# Patient Record
Sex: MALE | Race: WHITE | Employment: OTHER
[De-identification: names, ages, dates, MRNs, and addresses within clinical notes are randomized per-mention and may not be internally consistent; named-entity substitution may affect disease eponyms.]

---

## 2017-01-09 ENCOUNTER — OFFICE VISIT (OUTPATIENT)
Dept: FAMILY MEDICINE CLINIC | Facility: CLINIC | Age: 52
End: 2017-01-09

## 2017-01-09 VITALS
SYSTOLIC BLOOD PRESSURE: 128 MMHG | OXYGEN SATURATION: 98 % | HEART RATE: 72 BPM | HEIGHT: 68 IN | DIASTOLIC BLOOD PRESSURE: 86 MMHG | WEIGHT: 203 LBS | TEMPERATURE: 97.8 F | BODY MASS INDEX: 30.77 KG/M2

## 2017-01-09 DIAGNOSIS — Z13.220 SCREENING, LIPID: ICD-10-CM

## 2017-01-09 DIAGNOSIS — M75.21 BICIPITAL TENDONITIS OF RIGHT SHOULDER: Primary | ICD-10-CM

## 2017-01-09 PROCEDURE — 99203 OFFICE O/P NEW LOW 30 MIN: CPT | Performed by: PHYSICIAN ASSISTANT

## 2017-01-09 RX ORDER — MELOXICAM 7.5 MG/1
7.5 TABLET ORAL 2 TIMES DAILY PRN
Qty: 30 TABLET | Refills: 0 | Status: SHIPPED | OUTPATIENT
Start: 2017-01-09 | End: 2018-05-18 | Stop reason: ALTCHOICE

## 2017-01-09 ASSESSMENT — ENCOUNTER SYMPTOMS
COUGH: 0
DIARRHEA: 0
VOMITING: 0
WHEEZING: 0
COLOR CHANGE: 0
NAUSEA: 0
SHORTNESS OF BREATH: 0
BACK PAIN: 0
CONSTIPATION: 0
ABDOMINAL PAIN: 0

## 2018-05-18 ENCOUNTER — OFFICE VISIT (OUTPATIENT)
Dept: FAMILY MEDICINE CLINIC | Age: 53
End: 2018-05-18
Payer: COMMERCIAL

## 2018-05-18 VITALS
BODY MASS INDEX: 31.49 KG/M2 | HEIGHT: 68 IN | DIASTOLIC BLOOD PRESSURE: 70 MMHG | SYSTOLIC BLOOD PRESSURE: 122 MMHG | TEMPERATURE: 97.8 F | HEART RATE: 56 BPM | WEIGHT: 207.8 LBS | OXYGEN SATURATION: 99 %

## 2018-05-18 DIAGNOSIS — Z12.5 PROSTATE CANCER SCREENING: ICD-10-CM

## 2018-05-18 DIAGNOSIS — Z13.220 SCREENING, LIPID: ICD-10-CM

## 2018-05-18 DIAGNOSIS — R10.9 FLANK PAIN: ICD-10-CM

## 2018-05-18 DIAGNOSIS — R30.0 DYSURIA: ICD-10-CM

## 2018-05-18 DIAGNOSIS — Z12.11 COLON CANCER SCREENING: ICD-10-CM

## 2018-05-18 DIAGNOSIS — R31.29 MICROSCOPIC HEMATURIA: Primary | ICD-10-CM

## 2018-05-18 LAB
BILIRUBIN, POC: ABNORMAL
BLOOD URINE, POC: ABNORMAL
CLARITY, POC: ABNORMAL
COLOR, POC: YELLOW
GLUCOSE URINE, POC: ABNORMAL
KETONES, POC: ABNORMAL
LEUKOCYTE EST, POC: ABNORMAL
NITRITE, POC: ABNORMAL
PH, POC: 5.5
PROTEIN, POC: 100
SPECIFIC GRAVITY, POC: 1.02
UROBILINOGEN, POC: 0.2

## 2018-05-18 PROCEDURE — 99213 OFFICE O/P EST LOW 20 MIN: CPT | Performed by: PHYSICIAN ASSISTANT

## 2018-05-18 PROCEDURE — 81003 URINALYSIS AUTO W/O SCOPE: CPT | Performed by: PHYSICIAN ASSISTANT

## 2018-05-18 RX ORDER — OMEPRAZOLE 20 MG/1
20 CAPSULE, DELAYED RELEASE ORAL DAILY
COMMUNITY
End: 2019-10-30

## 2018-05-18 ASSESSMENT — PATIENT HEALTH QUESTIONNAIRE - PHQ9
2. FEELING DOWN, DEPRESSED OR HOPELESS: 0
SUM OF ALL RESPONSES TO PHQ9 QUESTIONS 1 & 2: 0
1. LITTLE INTEREST OR PLEASURE IN DOING THINGS: 0
SUM OF ALL RESPONSES TO PHQ QUESTIONS 1-9: 0

## 2018-05-18 ASSESSMENT — ENCOUNTER SYMPTOMS
NAUSEA: 0
VOMITING: 0

## 2018-12-26 ENCOUNTER — TELEPHONE (OUTPATIENT)
Dept: GASTROENTEROLOGY | Age: 53
End: 2018-12-26

## 2019-10-30 ENCOUNTER — OFFICE VISIT (OUTPATIENT)
Dept: FAMILY MEDICINE CLINIC | Age: 54
End: 2019-10-30
Payer: COMMERCIAL

## 2019-10-30 VITALS
HEART RATE: 55 BPM | HEIGHT: 68 IN | SYSTOLIC BLOOD PRESSURE: 128 MMHG | DIASTOLIC BLOOD PRESSURE: 75 MMHG | OXYGEN SATURATION: 96 % | WEIGHT: 197 LBS | BODY MASS INDEX: 29.86 KG/M2 | TEMPERATURE: 97.6 F

## 2019-10-30 DIAGNOSIS — Z12.5 SCREENING FOR MALIGNANT NEOPLASM OF PROSTATE: ICD-10-CM

## 2019-10-30 DIAGNOSIS — R09.82 PND (POST-NASAL DRIP): Primary | ICD-10-CM

## 2019-10-30 DIAGNOSIS — Z12.11 SCREEN FOR COLON CANCER: ICD-10-CM

## 2019-10-30 DIAGNOSIS — Z23 FLU VACCINE NEED: ICD-10-CM

## 2019-10-30 DIAGNOSIS — Z13.220 SCREENING FOR LIPID DISORDERS: ICD-10-CM

## 2019-10-30 DIAGNOSIS — Z00.00 ROUTINE GENERAL MEDICAL EXAMINATION AT A HEALTH CARE FACILITY: ICD-10-CM

## 2019-10-30 DIAGNOSIS — R05.9 COUGH: ICD-10-CM

## 2019-10-30 PROCEDURE — 99213 OFFICE O/P EST LOW 20 MIN: CPT | Performed by: NURSE PRACTITIONER

## 2019-10-30 RX ORDER — BENZONATATE 200 MG/1
200 CAPSULE ORAL 3 TIMES DAILY PRN
Qty: 30 CAPSULE | Refills: 0 | Status: SHIPPED | OUTPATIENT
Start: 2019-10-30 | End: 2019-11-06

## 2019-10-30 ASSESSMENT — ENCOUNTER SYMPTOMS
EYE DISCHARGE: 0
SINUS PAIN: 0
VOMITING: 0
SORE THROAT: 0
NAUSEA: 0
CHEST TIGHTNESS: 0
SINUS PRESSURE: 0
COUGH: 1
WHEEZING: 0
VOICE CHANGE: 1
DIARRHEA: 0
SHORTNESS OF BREATH: 0
RHINORRHEA: 0

## 2019-10-30 ASSESSMENT — PATIENT HEALTH QUESTIONNAIRE - PHQ9
SUM OF ALL RESPONSES TO PHQ QUESTIONS 1-9: 0
SUM OF ALL RESPONSES TO PHQ9 QUESTIONS 1 & 2: 0
2. FEELING DOWN, DEPRESSED OR HOPELESS: 0
1. LITTLE INTEREST OR PLEASURE IN DOING THINGS: 0
SUM OF ALL RESPONSES TO PHQ QUESTIONS 1-9: 0

## 2021-02-10 ENCOUNTER — TELEPHONE (OUTPATIENT)
Dept: FAMILY MEDICINE CLINIC | Age: 56
End: 2021-02-10

## 2023-01-16 ENCOUNTER — OFFICE VISIT (OUTPATIENT)
Dept: FAMILY MEDICINE CLINIC | Age: 58
End: 2023-01-16
Payer: COMMERCIAL

## 2023-01-16 VITALS
TEMPERATURE: 97.9 F | BODY MASS INDEX: 31.22 KG/M2 | HEIGHT: 68 IN | SYSTOLIC BLOOD PRESSURE: 130 MMHG | HEART RATE: 80 BPM | DIASTOLIC BLOOD PRESSURE: 84 MMHG | WEIGHT: 206 LBS | OXYGEN SATURATION: 98 %

## 2023-01-16 DIAGNOSIS — Z12.5 SCREENING FOR PROSTATE CANCER: ICD-10-CM

## 2023-01-16 DIAGNOSIS — K21.9 GASTROESOPHAGEAL REFLUX DISEASE, UNSPECIFIED WHETHER ESOPHAGITIS PRESENT: Primary | ICD-10-CM

## 2023-01-16 DIAGNOSIS — L72.9 CYST OF SKIN: ICD-10-CM

## 2023-01-16 PROCEDURE — 99213 OFFICE O/P EST LOW 20 MIN: CPT | Performed by: PHYSICIAN ASSISTANT

## 2023-01-16 RX ORDER — OMEPRAZOLE 20 MG/1
20 CAPSULE, DELAYED RELEASE ORAL DAILY
Qty: 30 CAPSULE | Refills: 5 | Status: SHIPPED | OUTPATIENT
Start: 2023-01-16

## 2023-01-16 RX ORDER — OMEPRAZOLE 20 MG/1
20 CAPSULE, DELAYED RELEASE ORAL DAILY
COMMUNITY
End: 2023-01-16 | Stop reason: SDUPTHER

## 2023-01-16 RX ORDER — FAMOTIDINE 20 MG/1
20 TABLET, FILM COATED ORAL 2 TIMES DAILY
Qty: 60 TABLET | Refills: 3 | Status: SHIPPED | OUTPATIENT
Start: 2023-01-16

## 2023-01-16 ASSESSMENT — ENCOUNTER SYMPTOMS
COUGH: 0
DIARRHEA: 0
SHORTNESS OF BREATH: 0
NAUSEA: 0
CONSTIPATION: 0
ABDOMINAL PAIN: 0
WHEEZING: 0
VOMITING: 0
ROS SKIN COMMENTS: CYST
COLOR CHANGE: 0

## 2023-01-16 ASSESSMENT — PATIENT HEALTH QUESTIONNAIRE - PHQ9
SUM OF ALL RESPONSES TO PHQ QUESTIONS 1-9: 0
SUM OF ALL RESPONSES TO PHQ9 QUESTIONS 1 & 2: 0
1. LITTLE INTEREST OR PLEASURE IN DOING THINGS: 0
SUM OF ALL RESPONSES TO PHQ QUESTIONS 1-9: 0
2. FEELING DOWN, DEPRESSED OR HOPELESS: 0

## 2023-01-16 NOTE — PROGRESS NOTES
[facility]  [address] [address]  Dept: [phone]  Dept Fax: [phone]    Sumeet Thompson is a 56 y.o. male who presents today for his medical conditions/complaintsas noted below. Sumeet Thompson is c/o of   Chief Complaint   Patient presents with    Cyst     ON BACK X 1 WEEK    Heartburn     He has been using omeprazole daily, OTC         HPI:     HPI    Patient states he has dealt with gastritis and GERD for many years. He had a EGD around 5 years ago. Has used OTC PPI off and on since. Requesting this to be sent through insurance. He has recently been using it daily but would like to back down on frequency of use. He is wondering if there is anything else he can use for more fast acting for as needed basis. Patient states he had a cyst on his back recently. It started after he had been riding his bike and exercising more actively. States he was sweating more at the time. It did drain after initially appearing and has been feeling much better since and also smaller. No results found for: LABA1C          ( goal A1Cis < 7)   No results found for: LABMICR  No results found for: LDLCHOLESTEROL, LDLCALC    (goal LDL is <100)   No results found for: AST, ALT, BUN, CR  BP Readings from Last 3 Encounters:   01/16/23 130/84   10/30/19 128/75   05/18/18 122/70          (goal 120/80)    Past Medical History:   Diagnosis Date    GERD (gastroesophageal reflux disease)     Nephrolithiasis       History reviewed. No pertinent surgical history.     Family History   Problem Relation Age of Onset    Cancer Mother         breast    No Known Problems Father     No Known Problems Sister     Other Brother         back surgery    No Known Problems Maternal Grandmother     No Known Problems Maternal Grandfather     No Known Problems Paternal Grandmother     No Known Problems Paternal Grandfather        Social History     Tobacco Use Smoking status: Former     Packs/day: 0.25     Years: 2.00     Pack years: 0.50     Types: Cigarettes     Start date: 2006     Quit date: 2008     Years since quitting: 15.0    Smokeless tobacco: Never   Substance Use Topics    Alcohol use: Yes     Comment: occasional      Current Outpatient Medications   Medication Sig Dispense Refill    famotidine (PEPCID) 20 MG tablet Take 1 tablet by mouth 2 times daily 60 tablet 3    omeprazole (PRILOSEC) 20 MG delayed release capsule Take 1 capsule by mouth daily 30 capsule 5     No current facility-administered medications for this visit. No Known Allergies    Health Maintenance   Topic Date Due    COVID-19 Vaccine (1) 1967    Depression Screen  Never done    HIV screen  Never done    Hepatitis C screen  Never done    DTaP/Tdap/Td vaccine (1 - Tdap) Never done    Diabetes screen  Never done    Lipids  Never done    Shingles vaccine (1 of 2) Never done    Colorectal Cancer Screen  02/26/2023    Flu vaccine  Completed    Hepatitis A vaccine  Aged Out    Hib vaccine  Aged Out    Meningococcal (ACWY) vaccine  Aged Out    Pneumococcal 0-64 years Vaccine  Aged Out       Subjective:     Review of Systems   Constitutional:  Negative for activity change, appetite change, fatigue, fever and unexpected weight change. /84 (Site: Left Upper Arm, Position: Sitting, Cuff Size: Large Adult)   Pulse 80   Temp 97.9 °F (36.6 °C) (Tympanic)   Ht 5' 8\" (1.727 m)   Wt 206 lb (93.4 kg)   SpO2 98%   BMI 31.32 kg/m²    Respiratory:  Negative for cough, shortness of breath and wheezing. Cardiovascular:  Negative for chest pain and palpitations. Gastrointestinal:  Negative for abdominal pain, constipation, diarrhea, nausea and vomiting. Skin:  Positive for wound. Negative for color change, pallor and rash. Cyst   Neurological:  Negative for weakness. Psychiatric/Behavioral:  The patient is not nervous/anxious.       Objective:     Physical Exam  Vitals and nursing note reviewed. Constitutional:       General: He is not in acute distress. Appearance: Normal appearance. He is well-developed. He is not ill-appearing. HENT:      Head: Normocephalic and atraumatic. Cardiovascular:      Rate and Rhythm: Normal rate and regular rhythm. Heart sounds: No murmur heard. Pulmonary:      Effort: Pulmonary effort is normal. No respiratory distress. Breath sounds: Normal breath sounds. No wheezing, rhonchi or rales. Skin:     General: Skin is warm and dry. Coloration: Skin is not pale. Findings: No erythema or rash. Comments: Very small pink, healing sebaceous cyst mid back. No induration, no evidence of infection. Appears to be healing very well   Neurological:      Mental Status: He is alert and oriented to person, place, and time. Psychiatric:         Mood and Affect: Mood normal.         Behavior: Behavior normal.         Thought Content: Thought content normal.         Judgment: Judgment normal.     /84 (Site: Left Upper Arm, Position: Sitting, Cuff Size: Large Adult)   Pulse 80   Temp 97.9 °F (36.6 °C) (Tympanic)   Ht 5' 8\" (1.727 m)   Wt 206 lb (93.4 kg)   SpO2 98%   BMI 31.32 kg/m²     Assessment:       Diagnosis Orders   1. Gastroesophageal reflux disease, unspecified whether esophagitis present  famotidine (PEPCID) 20 MG tablet    omeprazole (PRILOSEC) 20 MG delayed release capsule    Comprehensive Metabolic Panel    Lipid Panel    CBC with Auto Differential      2. Screening for prostate cancer  PSA Screening      3. Cyst of skin                  Plan:      Return if symptoms worsen or fail to improve. Cyst is healing well since it drained on its own. No evidence of infection. I did encourage him to monitor the area and if it does refill or recur we may need to I&D it. Patient agreed to monitor  Update annual labs, order given. Await results  Refill on omeprazole.   Use and side effect reviewed along with long-term effect on kidney function and bone health. Patient would like to try Pepcid for as needed use and try to get off the PPI. I did refill for both. I recommended he continue to stay in contact with his gastroenterologist for 1 repeat EGD as needed  Patient agreed with treatment plan    Orders Placed This Encounter   Procedures    Comprehensive Metabolic Panel     Standing Status:   Future     Standing Expiration Date:   1/16/2024    Lipid Panel     Standing Status:   Future     Standing Expiration Date:   1/16/2024     Order Specific Question:   Is Patient Fasting?/# of Hours     Answer:   yes    CBC with Auto Differential     Standing Status:   Future     Standing Expiration Date:   1/16/2024    PSA Screening     Standing Status:   Future     Standing Expiration Date:   1/16/2024       Orders Placed This Encounter   Medications    famotidine (PEPCID) 20 MG tablet     Sig: Take 1 tablet by mouth 2 times daily     Dispense:  60 tablet     Refill:  3    omeprazole (PRILOSEC) 20 MG delayed release capsule     Sig: Take 1 capsule by mouth daily     Dispense:  30 capsule     Refill:  5         Patient given educational materials - see patient instructions. Discussed use, benefit, and side effects of prescribed medications. All patientquestions answered. Pt voiced understanding. Reviewed health maintenance. Instructedto continue current medications, diet and exercise. Patient agreed with treatmentplan. Follow up as directed. Please note that this chart was generated using voice recognition Dragon dictation software. Although every effort was made to ensure the accuracy of this automated transcription, some errors in transcription may have occurred.      Electronically signed by Conchita Livingston PA-C on 1/16/2023 at 1:08 PM

## 2023-01-16 NOTE — PROGRESS NOTES
Visit Information    Have you changed or started any medications since your last visit including any over-the-counter medicines, vitamins, or herbal medicines? no   Are you having any side effects from any of your medications? -  no  Have you stopped taking any of your medications? Is so, why? -  no    Have you seen any other physician or provider since your last visit? No  Have you had any other diagnostic tests since your last visit? No  Have you been seen in the emergency room and/or had an admission to a hospital since we last saw you? No  Have you had your routine dental cleaning in the past 6 months? no    Have you activated your [employer] account? If not, what are your barriers?  No:      Patient Care Team:  Rubi Walton PA-C as PCP - General (Family Medicine)  Rubi Walton PA-C as PCP - [facility] Provider    Medical History Review  Past Medical, Family, and Social History reviewed and  contribute to the patient presenting condition    Health Maintenance   Topic Date Due    COVID-19 Vaccine (1) Never done    Depression Screen  Never done    HIV screen  Never done    Hepatitis C screen  Never done    DTaP/Tdap/Td vaccine (1 - Tdap) Never done    Lipids  Never done    Shingles vaccine (1 of 2) Never done    Flu vaccine (1) Never done    Colorectal Cancer Screen  02/26/2023    Hepatitis A vaccine  Aged Out    Hib vaccine  Aged Out    Meningococcal (ACWY) vaccine  Aged Out    Pneumococcal 0-64 years Vaccine  Aged Out

## 2023-03-08 ENCOUNTER — OFFICE VISIT (OUTPATIENT)
Dept: FAMILY MEDICINE CLINIC | Age: 58
End: 2023-03-08
Payer: COMMERCIAL

## 2023-03-08 ENCOUNTER — TELEPHONE (OUTPATIENT)
Dept: FAMILY MEDICINE CLINIC | Age: 58
End: 2023-03-08

## 2023-03-08 VITALS
HEIGHT: 68 IN | TEMPERATURE: 98.1 F | DIASTOLIC BLOOD PRESSURE: 82 MMHG | BODY MASS INDEX: 30.92 KG/M2 | OXYGEN SATURATION: 98 % | HEART RATE: 81 BPM | SYSTOLIC BLOOD PRESSURE: 124 MMHG | WEIGHT: 204 LBS

## 2023-03-08 DIAGNOSIS — R51.9 NEW ONSET HEADACHE: Primary | ICD-10-CM

## 2023-03-08 DIAGNOSIS — R51.9 NOCTURNAL HEADACHES: Primary | ICD-10-CM

## 2023-03-08 DIAGNOSIS — R51.9 NOCTURNAL HEADACHES: ICD-10-CM

## 2023-03-08 PROCEDURE — 99213 OFFICE O/P EST LOW 20 MIN: CPT | Performed by: PHYSICIAN ASSISTANT

## 2023-03-08 SDOH — ECONOMIC STABILITY: FOOD INSECURITY: WITHIN THE PAST 12 MONTHS, YOU WORRIED THAT YOUR FOOD WOULD RUN OUT BEFORE YOU GOT MONEY TO BUY MORE.: NEVER TRUE

## 2023-03-08 SDOH — ECONOMIC STABILITY: INCOME INSECURITY: HOW HARD IS IT FOR YOU TO PAY FOR THE VERY BASICS LIKE FOOD, HOUSING, MEDICAL CARE, AND HEATING?: NOT HARD AT ALL

## 2023-03-08 SDOH — ECONOMIC STABILITY: FOOD INSECURITY: WITHIN THE PAST 12 MONTHS, THE FOOD YOU BOUGHT JUST DIDN'T LAST AND YOU DIDN'T HAVE MONEY TO GET MORE.: NEVER TRUE

## 2023-03-08 SDOH — ECONOMIC STABILITY: HOUSING INSECURITY
IN THE LAST 12 MONTHS, WAS THERE A TIME WHEN YOU DID NOT HAVE A STEADY PLACE TO SLEEP OR SLEPT IN A SHELTER (INCLUDING NOW)?: NO

## 2023-03-08 ASSESSMENT — ENCOUNTER SYMPTOMS
SHORTNESS OF BREATH: 0
COUGH: 0
WHEEZING: 0
COLOR CHANGE: 0

## 2023-03-08 NOTE — PROGRESS NOTES
Visit Information    Have you changed or started any medications since your last visit including any over-the-counter medicines, vitamins, or herbal medicines? no   Are you having any side effects from any of your medications? -  no  Have you stopped taking any of your medications? Is so, why? -  no    Have you seen any other physician or provider since your last visit? No  Have you had any other diagnostic tests since your last visit? No  Have you been seen in the emergency room and/or had an admission to a hospital since we last saw you? No  Have you had your routine dental cleaning in the past 6 months? no    Have you activated your [employer] account? If not, what are your barriers?  No:      Patient Care Team:  Zully Bermudez PA-C as PCP - General (Family Medicine)  Zully Bermudez PA-C as PCP - Empaneled Provider    Medical History Review  Past Medical, Family, and Social History reviewed and  contribute to the patient presenting condition    Health Maintenance   Topic Date Due    COVID-19 Vaccine (1) 1967    HIV screen  Never done    Hepatitis C screen  Never done    DTaP/Tdap/Td vaccine (1 - Tdap) Never done    Diabetes screen  Never done    Lipids  Never done    Shingles vaccine (1 of 2) Never done    Colorectal Cancer Screen  02/26/2023    Depression Screen  01/16/2024    Flu vaccine  Completed    Hepatitis A vaccine  Aged Out    Hib vaccine  Aged Out    Meningococcal (ACWY) vaccine  Aged Out    Pneumococcal 0-64 years Vaccine  Aged Out

## 2023-03-08 NOTE — TELEPHONE ENCOUNTER
Spoke with [employer]. Order is AKA CT head in our epic version. Please fax to [employer] advising of this.

## 2023-03-08 NOTE — TELEPHONE ENCOUNTER
Pt scheduled at [facility] for CT on 3/21/23. Pt unable to go to [id number] [facility] due to insurance. [facility] scheduling states they need order to say CT BRAIN WO.      Please advise

## 2023-03-08 NOTE — PROGRESS NOTES
[facility]  [address]  [facility] [facility] [address]  Dept: [phone]  Dept Fax: [phone]    Donavan Loja is a 56 y.o. male who presents today for his medical conditions/complaintsas noted below. Donavan Loja is c/o of   Chief Complaint   Patient presents with    Headache     Happening in the middle of the night, left side of his head- he has never had head like this in the past         HPI:     HPI    Patient states he has been having headaches x 3 weeks that wake him up at night around 1am. He states during the day he feels well. Never been bothered by headaches in the past. He has had a lot of stress with his fathers illness recently. Initially thought stress was a trigger. He normally sleeps well, normally uninterupted  Pain presents with left temple pain that radiates to the left eye. Taking an aleve does help. He started taking an aleve prior to bed and has slept better. No vision changes. Pain feels like a deep, pressure feeling. If he pushes on the side of the head it helps. No noted nausea or other symptoms related. Patient denies any neck or back pain. No noted numbness/tingling. No results found for: LABA1C          ( goal A1Cis < 7)   No results found for: LABMICR  No results found for: LDLCHOLESTEROL, LDLCALC    (goal LDL is <100)   No results found for: AST, ALT, BUN, CR  BP Readings from Last 3 Encounters:   03/08/23 124/82   01/16/23 130/84   10/30/19 128/75          (goal 120/80)    Past Medical History:   Diagnosis Date    GERD (gastroesophageal reflux disease)     Nephrolithiasis       History reviewed. No pertinent surgical history.     Family History   Problem Relation Age of Onset    Cancer Mother         breast    No Known Problems Father     No Known Problems Sister     Other Brother         back surgery    No Known Problems Maternal Grandmother     No Known Problems Maternal Grandfather     No Known Problems Paternal Grandmother     No Known Problems Paternal Grandfather        Social History     Tobacco Use    Smoking status: Former     Packs/day: 0.25     Years: 2.00     Pack years: 0.50     Types: Cigarettes     Start date: 2006     Quit date: 2008     Years since quitting: 15.1    Smokeless tobacco: Never   Substance Use Topics    Alcohol use: Yes     Comment: occasional      Current Outpatient Medications   Medication Sig Dispense Refill    omeprazole (PRILOSEC) 20 MG delayed release capsule Take 1 capsule by mouth daily 30 capsule 5    famotidine (PEPCID) 20 MG tablet Take 1 tablet by mouth 2 times daily (Patient not taking: Reported on 3/8/2023) 60 tablet 3     No current facility-administered medications for this visit. No Known Allergies    Health Maintenance   Topic Date Due    COVID-19 Vaccine (1) 1965    HIV screen  Never done    Hepatitis C screen  Never done    DTaP/Tdap/Td vaccine (1 - Tdap) Never done    Diabetes screen  Never done    Lipids  Never done    Shingles vaccine (1 of 2) Never done    Colorectal Cancer Screen  02/26/2023    Depression Screen  01/16/2024    Flu vaccine  Completed    Hepatitis A vaccine  Aged Out    Hib vaccine  Aged Out    Meningococcal (ACWY) vaccine  Aged Out    Pneumococcal 0-64 years Vaccine  Aged Out       Subjective:     Review of Systems   Constitutional:  Negative for activity change, appetite change, fatigue, fever and unexpected weight change. /82 (Site: Left Upper Arm, Position: Sitting, Cuff Size: Large Adult)   Pulse 81   Temp 98.1 °F (36.7 °C) (Tympanic)   Ht 5' 8\" (1.727 m)   Wt 204 lb (92.5 kg)   SpO2 98%   BMI 31.02 kg/m²    HENT:  Negative for congestion. Eyes:  Negative for visual disturbance. Respiratory:  Negative for cough, shortness of breath and wheezing. Cardiovascular:  Negative for chest pain, palpitations and leg swelling. Skin:  Negative for color change and pallor.    Neurological:  Positive for headaches. Negative for dizziness, facial asymmetry, weakness, light-headedness and numbness. Hematological:  Negative for adenopathy. Psychiatric/Behavioral:  Positive for sleep disturbance. Negative for agitation, behavioral problems, confusion and decreased concentration. The patient is not nervous/anxious. Objective:     Physical Exam  Vitals and nursing note reviewed. Constitutional:       General: He is not in acute distress. Appearance: Normal appearance. He is well-developed. He is not ill-appearing. HENT:      Head: Normocephalic and atraumatic. Eyes:      General:         Right eye: No discharge. Left eye: No discharge. Extraocular Movements: Extraocular movements intact. Pupils: Pupils are equal, round, and reactive to light. Neck:      Vascular: No carotid bruit. Cardiovascular:      Rate and Rhythm: Normal rate and regular rhythm. Heart sounds: No murmur heard. Pulmonary:      Effort: Pulmonary effort is normal. No respiratory distress. Breath sounds: Normal breath sounds. No wheezing, rhonchi or rales. Musculoskeletal:      Cervical back: Neck supple. Lymphadenopathy:      Cervical: No cervical adenopathy. Skin:     General: Skin is warm and dry. Coloration: Skin is not pale. Findings: No erythema or rash. Neurological:      General: No focal deficit present. Mental Status: He is alert and oriented to person, place, and time. Mental status is at baseline. Cranial Nerves: No cranial nerve deficit. Sensory: No sensory deficit. Motor: No weakness. Gait: Gait normal.   Psychiatric:         Mood and Affect: Mood normal.         Behavior: Behavior normal.         Thought Content:  Thought content normal.         Judgment: Judgment normal.     /82 (Site: Left Upper Arm, Position: Sitting, Cuff Size: Large Adult)   Pulse 81   Temp 98.1 °F (36.7 °C) (Tympanic)   Ht 5' 8\" (1.727 m)   Wt 204 lb (92.5 kg) SpO2 98%   BMI 31.02 kg/m²     Assessment:       Diagnosis Orders   1. New onset headache  CT HEAD WO CONTRAST      2. Nocturnal headaches  CT HEAD WO CONTRAST                Plan:      Return if symptoms worsen or fail to improve, for await results.    Will check CT at this point as these headaches are new. Recommended he also have his eyes checked. Patient agreed to call to schedule  Ok to use Aleve prn, encouraged to dose with food.   Use ER if any acute severe or worsening symptoms  Patient agreed with treatment plan  Await results    Orders Placed This Encounter   Procedures    CT HEAD WO CONTRAST     Standing Status:   Future     Standing Expiration Date:   3/8/2024     Order Specific Question:   Reason for exam:     Answer:   nocturnal headache       Patient given educational materials - see patient instructions.Discussed use, benefit, and side effects of prescribed medications.  All patientquestions answered. Pt voiced understanding. Reviewed health maintenance.  Instructedto continue current medications, diet and exercise.  Patient agreed with treatmentplan. Follow up as directed.       Please note that this chart was generated using voice recognition Dragon dictation software.  Although every effort was made to ensure the accuracy of this automated transcription, some errors in transcription may have occurred.     Electronically signed by JARROD POLANCO PA-C on 3/8/2023 at 1:25 PM

## 2023-06-08 ENCOUNTER — TELEPHONE (OUTPATIENT)
Dept: FAMILY MEDICINE CLINIC | Age: 58
End: 2023-06-08

## 2023-06-08 NOTE — TELEPHONE ENCOUNTER
Triage call from [facility] ([facility]) to office     Brief description of triage: right shoulder pain with numbness    Onset? 1 year ago    Location? Right shoulder    Pattern? Comes and goes    Severity on a scale of 1-10? N/A    Radiation? N/A    Pregnancy? N/A    Outcome? Patient was sent in for nurse triage due to right shoulder pain and numbness. Patient states this has been an ongoing issue for 1 year but that it is getting worse. Due to length of time, no chest pain or radiation to chest , or shortness of breath patient made appt for 6/15.

## 2023-06-20 LAB
ALBUMIN SERPL-MCNC: 4.3 G/DL
ALP BLD-CCNC: 78 U/L
ALT SERPL-CCNC: 18 U/L
ANION GAP SERPL CALCULATED.3IONS-SCNC: 11 MMOL/L
AST SERPL-CCNC: 17 U/L
AVERAGE GLUCOSE: 114
BASOPHILS ABSOLUTE: 0 /ΜL
BASOPHILS RELATIVE PERCENT: 0.7 %
BILIRUB SERPL-MCNC: 0.7 MG/DL (ref 0.1–1.4)
BUN BLDV-MCNC: 14 MG/DL
CALCIUM SERPL-MCNC: 9.3 MG/DL
CHLORIDE BLD-SCNC: 110 MMOL/L
CHOLESTEROL, TOTAL: 222 MG/DL
CHOLESTEROL/HDL RATIO: 5.2
CO2: 23 MMOL/L
CREAT SERPL-MCNC: 0.91 MG/DL
EGFR: 90
EOSINOPHILS ABSOLUTE: 0.1 /ΜL
EOSINOPHILS RELATIVE PERCENT: 2.1 %
GLUCOSE BLD-MCNC: 102 MG/DL
HBA1C MFR BLD: 5.6 %
HCT VFR BLD CALC: 44.1 % (ref 41–53)
HDLC SERPL-MCNC: 43 MG/DL (ref 35–70)
HEMOGLOBIN: 15.1 G/DL (ref 13.5–17.5)
LDL CHOLESTEROL CALCULATED: 153 MG/DL (ref 0–160)
LYMPHOCYTES ABSOLUTE: 1.7 /ΜL
LYMPHOCYTES RELATIVE PERCENT: 31.4 %
MCH RBC QN AUTO: 31.9 PG
MCHC RBC AUTO-ENTMCNC: 34.2 G/DL
MCV RBC AUTO: 93 FL
MONOCYTES ABSOLUTE: 0.5 /ΜL
MONOCYTES RELATIVE PERCENT: 9 %
NEUTROPHILS ABSOLUTE: 3 /ΜL
NEUTROPHILS RELATIVE PERCENT: 56.8 %
NONHDLC SERPL-MCNC: NORMAL MG/DL
PDW BLD-RTO: 13.5 %
PLATELET # BLD: 222 K/ΜL
PMV BLD AUTO: 8.8 FL
POTASSIUM SERPL-SCNC: 3.9 MMOL/L
PROSTATE SPECIFIC ANTIGEN: 1.26 NG/ML
RBC # BLD: 4.73 10^6/ΜL
SODIUM BLD-SCNC: 144 MMOL/L
TOTAL PROTEIN: 7
TRIGL SERPL-MCNC: 130 MG/DL
TSH SERPL DL<=0.05 MIU/L-ACNC: 2.4 UIU/ML
VLDLC SERPL CALC-MCNC: 26 MG/DL
WBC # BLD: 5.3 10^3/ML

## 2023-06-21 DIAGNOSIS — K21.9 GASTROESOPHAGEAL REFLUX DISEASE, UNSPECIFIED WHETHER ESOPHAGITIS PRESENT: ICD-10-CM

## 2023-06-21 DIAGNOSIS — Z13.220 SCREENING, LIPID: ICD-10-CM

## 2023-06-21 DIAGNOSIS — Z12.5 SCREENING FOR PROSTATE CANCER: ICD-10-CM

## 2023-06-21 DIAGNOSIS — Z13.1 SCREENING FOR DIABETES MELLITUS: ICD-10-CM

## 2024-03-19 ENCOUNTER — OFFICE VISIT (OUTPATIENT)
Dept: FAMILY MEDICINE CLINIC | Age: 59
End: 2024-03-19
Payer: COMMERCIAL

## 2024-03-19 VITALS
OXYGEN SATURATION: 98 % | HEIGHT: 68 IN | DIASTOLIC BLOOD PRESSURE: 84 MMHG | BODY MASS INDEX: 32.28 KG/M2 | HEART RATE: 74 BPM | SYSTOLIC BLOOD PRESSURE: 128 MMHG | WEIGHT: 213 LBS

## 2024-03-19 DIAGNOSIS — Z12.5 SCREENING FOR PROSTATE CANCER: ICD-10-CM

## 2024-03-19 DIAGNOSIS — M25.511 CHRONIC RIGHT SHOULDER PAIN: Primary | ICD-10-CM

## 2024-03-19 DIAGNOSIS — G89.29 CHRONIC RIGHT SHOULDER PAIN: Primary | ICD-10-CM

## 2024-03-19 DIAGNOSIS — Z13.220 SCREENING, LIPID: ICD-10-CM

## 2024-03-19 DIAGNOSIS — Z13.1 SCREENING FOR DIABETES MELLITUS: ICD-10-CM

## 2024-03-19 DIAGNOSIS — Z11.59 NEED FOR HEPATITIS C SCREENING TEST: ICD-10-CM

## 2024-03-19 DIAGNOSIS — R39.15 URINARY URGENCY: ICD-10-CM

## 2024-03-19 DIAGNOSIS — K21.9 GASTROESOPHAGEAL REFLUX DISEASE, UNSPECIFIED WHETHER ESOPHAGITIS PRESENT: ICD-10-CM

## 2024-03-19 PROCEDURE — 99213 OFFICE O/P EST LOW 20 MIN: CPT | Performed by: PHYSICIAN ASSISTANT

## 2024-03-19 RX ORDER — TOLTERODINE 4 MG/1
4 CAPSULE, EXTENDED RELEASE ORAL DAILY
Qty: 30 CAPSULE | Refills: 0 | Status: SHIPPED | OUTPATIENT
Start: 2024-03-19

## 2024-03-19 RX ORDER — OMEPRAZOLE 20 MG/1
20 CAPSULE, DELAYED RELEASE ORAL DAILY
Qty: 30 CAPSULE | Refills: 5 | Status: SHIPPED | OUTPATIENT
Start: 2024-03-19

## 2024-03-19 SDOH — ECONOMIC STABILITY: FOOD INSECURITY: WITHIN THE PAST 12 MONTHS, THE FOOD YOU BOUGHT JUST DIDN'T LAST AND YOU DIDN'T HAVE MONEY TO GET MORE.: NEVER TRUE

## 2024-03-19 SDOH — ECONOMIC STABILITY: FOOD INSECURITY: WITHIN THE PAST 12 MONTHS, YOU WORRIED THAT YOUR FOOD WOULD RUN OUT BEFORE YOU GOT MONEY TO BUY MORE.: NEVER TRUE

## 2024-03-19 SDOH — ECONOMIC STABILITY: INCOME INSECURITY: HOW HARD IS IT FOR YOU TO PAY FOR THE VERY BASICS LIKE FOOD, HOUSING, MEDICAL CARE, AND HEATING?: NOT HARD AT ALL

## 2024-03-19 ASSESSMENT — PATIENT HEALTH QUESTIONNAIRE - PHQ9
SUM OF ALL RESPONSES TO PHQ QUESTIONS 1-9: 0
1. LITTLE INTEREST OR PLEASURE IN DOING THINGS: NOT AT ALL
2. FEELING DOWN, DEPRESSED OR HOPELESS: NOT AT ALL
SUM OF ALL RESPONSES TO PHQ9 QUESTIONS 1 & 2: 0
SUM OF ALL RESPONSES TO PHQ QUESTIONS 1-9: 0

## 2024-03-19 ASSESSMENT — ENCOUNTER SYMPTOMS
ABDOMINAL PAIN: 0
COLOR CHANGE: 0
CONSTIPATION: 0
NAUSEA: 0
COUGH: 0
DIARRHEA: 0
VOMITING: 0
SHORTNESS OF BREATH: 0

## 2024-03-19 NOTE — PROGRESS NOTES
Visit Information    Have you changed or started any medications since your last visit including any over-the-counter medicines, vitamins, or herbal medicines? no   Are you having any side effects from any of your medications? -  no  Have you stopped taking any of your medications? Is so, why? -  no    Have you seen any other physician or provider since your last visit? No  Have you had any other diagnostic tests since your last visit? No  Have you been seen in the emergency room and/or had an admission to a hospital since we last saw you? No  Have you had your routine dental cleaning in the past 6 months? no    Have you activated your [employer] account? If not, what are your barriers? Yes     Patient Care Team:  Silva White PA-C as PCP - General (Family Medicine)  Silva White PA-C as PCP - Empaneled Provider    Medical History Review  Past Medical, Family, and Social History reviewed and  contribute to the patient presenting condition    Health Maintenance   Topic Date Due    Hepatitis B vaccine (1 of 3 - 3-dose series) Never done    HIV screen  Never done    Hepatitis C screen  Never done    DTaP/Tdap/Td vaccine (1 - Tdap) Never done    Shingles vaccine (1 of 2) Never done    Colorectal Cancer Screen  02/26/2023    Flu vaccine (1) 08/01/2023    COVID-19 Vaccine (2 - 2023-24 season) 09/01/2023    Depression Screen  01/16/2024    Diabetes screen  06/20/2026    Lipids  06/20/2028    Hepatitis A vaccine  Aged Out    Hib vaccine  Aged Out    Polio vaccine  Aged Out    Meningococcal (ACWY) vaccine  Aged Out    Pneumococcal 0-64 years Vaccine  Aged Out    Prostate Specific Antigen (PSA) Screening or Monitoring  Discontinued       
disease)     Nephrolithiasis       History reviewed. No pertinent surgical history.    Family History   Problem Relation Age of Onset    Cancer Mother         breast    No Known Problems Father     No Known Problems Sister     Other Brother         back surgery    No Known Problems Maternal Grandmother     No Known Problems Maternal Grandfather     No Known Problems Paternal Grandmother     No Known Problems Paternal Grandfather        Social History     Tobacco Use    Smoking status: Former     Current packs/day: 0.00     Average packs/day: 0.3 packs/day for 2.0 years (0.5 ttl pk-yrs)     Types: Cigarettes     Start date:      Quit date:      Years since quittin.2    Smokeless tobacco: Never   Substance Use Topics    Alcohol use: Yes     Comment: occasional      Current Outpatient Medications   Medication Sig Dispense Refill    omeprazole (PRILOSEC) 20 MG delayed release capsule Take 1 capsule by mouth daily 30 capsule 5    tolterodine (DETROL LA) 4 MG extended release capsule Take 1 capsule by mouth daily 30 capsule 0     No current facility-administered medications for this visit.     No Known Allergies    Health Maintenance   Topic Date Due    Hepatitis B vaccine (1 of 3 - 3-dose series) Never done    HIV screen  Never done    Hepatitis C screen  Never done    DTaP/Tdap/Td vaccine (1 - Tdap) Never done    Shingles vaccine (1 of 2) Never done    Colorectal Cancer Screen  2023    Flu vaccine (1) 2023    COVID-19 Vaccine (2 - - season) 2023    Depression Screen  2025    Diabetes screen  2026    Lipids  2028    Hepatitis A vaccine  Aged Out    Hib vaccine  Aged Out    Polio vaccine  Aged Out    Meningococcal (ACWY) vaccine  Aged Out    Pneumococcal 0-64 years Vaccine  Aged Out    Prostate Specific Antigen (PSA) Screening or Monitoring  Discontinued       Subjective:     Review of Systems   Constitutional:  Negative for activity change, appetite change, fatigue

## 2024-04-11 LAB
BILIRUBIN, URINE: NORMAL
BLOOD, URINE: NORMAL
CLARITY: NORMAL
COLOR: NORMAL
GLUCOSE URINE: NORMAL
KETONES, URINE: NORMAL
LEUKOCYTE ESTERASE, URINE: NORMAL
NITRITE, URINE: NORMAL
PH UA: NORMAL
PROTEIN UA: NORMAL
SPECIFIC GRAVITY, URINE: NORMAL
UROBILINOGEN, URINE: NORMAL

## 2024-04-12 LAB
ALBUMIN SERPL-MCNC: 4.4 G/DL
ALP BLD-CCNC: 91 U/L
ALT SERPL-CCNC: 18 U/L
ANION GAP SERPL CALCULATED.3IONS-SCNC: 8 MMOL/L
ANTIBODY: NEGATIVE
AST SERPL-CCNC: 16 U/L
BASOPHILS ABSOLUTE: 0 /ΜL
BASOPHILS RELATIVE PERCENT: 0.6 %
BILIRUB SERPL-MCNC: 0.9 MG/DL (ref 0.1–1.4)
BUN BLDV-MCNC: 17 MG/DL
CALCIUM SERPL-MCNC: 9 MG/DL
CHLORIDE BLD-SCNC: 106 MMOL/L
CHOLESTEROL, TOTAL: 243 MG/DL
CHOLESTEROL/HDL RATIO: 4.9
CO2: 24 MMOL/L
CREAT SERPL-MCNC: 0.9 MG/DL
EGFR: 90
EOSINOPHILS ABSOLUTE: 0.1 /ΜL
EOSINOPHILS RELATIVE PERCENT: 1 %
ESTIMATED AVERAGE GLUCOSE: 111
GLUCOSE BLD-MCNC: 102 MG/DL
HBA1C MFR BLD: 5.5 %
HCT VFR BLD CALC: 43.8 % (ref 41–53)
HDLC SERPL-MCNC: 50 MG/DL (ref 35–70)
HEMOGLOBIN: 14.8 G/DL (ref 13.5–17.5)
LDL CHOLESTEROL CALCULATED: 169 MG/DL (ref 0–160)
LYMPHOCYTES ABSOLUTE: 1.8 /ΜL
LYMPHOCYTES RELATIVE PERCENT: 28.8 %
MCH RBC QN AUTO: 31.5 PG
MCHC RBC AUTO-ENTMCNC: 33.8 G/DL
MCV RBC AUTO: 93 FL
MONOCYTES ABSOLUTE: 0.6 /ΜL
MONOCYTES RELATIVE PERCENT: 9.1 %
NEUTROPHILS ABSOLUTE: 3.7 /ΜL
NEUTROPHILS RELATIVE PERCENT: 60.5 %
NONHDLC SERPL-MCNC: ABNORMAL MG/DL
PDW BLD-RTO: 13.9 %
PLATELET # BLD: 230 K/ΜL
PMV BLD AUTO: 8.1 FL
POTASSIUM SERPL-SCNC: 4 MMOL/L
RBC # BLD: 4.71 10^6/ΜL
SODIUM BLD-SCNC: 138 MMOL/L
TOTAL PROTEIN: 7.1
TRIGL SERPL-MCNC: 119 MG/DL
VLDLC SERPL CALC-MCNC: 24 MG/DL
WBC # BLD: 6.2 10^3/ML

## 2024-04-15 DIAGNOSIS — R39.15 URINARY URGENCY: ICD-10-CM

## 2024-04-15 DIAGNOSIS — Z13.220 SCREENING, LIPID: ICD-10-CM

## 2024-04-15 DIAGNOSIS — Z11.59 NEED FOR HEPATITIS C SCREENING TEST: ICD-10-CM

## 2024-04-15 DIAGNOSIS — Z13.1 SCREENING FOR DIABETES MELLITUS: ICD-10-CM

## 2024-04-16 DIAGNOSIS — E78.5 DYSLIPIDEMIA: Primary | ICD-10-CM

## 2024-04-16 RX ORDER — ATORVASTATIN CALCIUM 20 MG/1
20 TABLET, FILM COATED ORAL DAILY
Qty: 30 TABLET | Refills: 3 | Status: SHIPPED | OUTPATIENT
Start: 2024-04-16

## 2024-05-28 DIAGNOSIS — R39.15 URINARY URGENCY: ICD-10-CM

## 2024-05-28 DIAGNOSIS — E78.5 DYSLIPIDEMIA: ICD-10-CM

## 2024-05-28 DIAGNOSIS — K21.9 GASTROESOPHAGEAL REFLUX DISEASE, UNSPECIFIED WHETHER ESOPHAGITIS PRESENT: ICD-10-CM

## 2024-05-28 RX ORDER — OMEPRAZOLE 20 MG/1
20 CAPSULE, DELAYED RELEASE ORAL DAILY
Qty: 30 CAPSULE | Refills: 5 | Status: SHIPPED | OUTPATIENT
Start: 2024-05-28

## 2024-05-28 RX ORDER — TOLTERODINE 4 MG/1
4 CAPSULE, EXTENDED RELEASE ORAL DAILY
Qty: 30 CAPSULE | Refills: 0 | Status: SHIPPED | OUTPATIENT
Start: 2024-05-28

## 2024-05-28 RX ORDER — ATORVASTATIN CALCIUM 20 MG/1
20 TABLET, FILM COATED ORAL DAILY
Qty: 30 TABLET | Refills: 3 | Status: SHIPPED | OUTPATIENT
Start: 2024-05-28

## 2024-11-12 ENCOUNTER — OFFICE VISIT (OUTPATIENT)
Dept: FAMILY MEDICINE CLINIC | Age: 59
End: 2024-11-12
Payer: COMMERCIAL

## 2024-11-12 VITALS
OXYGEN SATURATION: 98 % | HEIGHT: 68 IN | TEMPERATURE: 97.3 F | SYSTOLIC BLOOD PRESSURE: 138 MMHG | DIASTOLIC BLOOD PRESSURE: 84 MMHG | HEART RATE: 83 BPM | BODY MASS INDEX: 30.46 KG/M2 | WEIGHT: 201 LBS

## 2024-11-12 DIAGNOSIS — M25.511 CHRONIC RIGHT SHOULDER PAIN: ICD-10-CM

## 2024-11-12 DIAGNOSIS — G89.29 CHRONIC RIGHT SHOULDER PAIN: ICD-10-CM

## 2024-11-12 DIAGNOSIS — Z12.11 SCREENING FOR COLON CANCER: ICD-10-CM

## 2024-11-12 DIAGNOSIS — E78.5 DYSLIPIDEMIA: Primary | ICD-10-CM

## 2024-11-12 PROCEDURE — 99213 OFFICE O/P EST LOW 20 MIN: CPT | Performed by: PHYSICIAN ASSISTANT

## 2024-11-12 ASSESSMENT — ENCOUNTER SYMPTOMS
ABDOMINAL PAIN: 0
COLOR CHANGE: 0
COUGH: 0
VOMITING: 0
DIARRHEA: 0
NAUSEA: 0
WHEEZING: 0
SHORTNESS OF BREATH: 0
CONSTIPATION: 0

## 2024-11-12 NOTE — PROGRESS NOTES
[facility]  [facility]  [address] [address]  Dept: [phone]  Dept Fax: [phone]    Davide Caldera is a 59 y.o. male who presents today for his medical conditions/complaintsas noted below.  Davide Caldera is c/o of   Chief Complaint   Patient presents with    Shoulder Pain     Chronic right shoulder pain    Cholesterol Problem       HPI:     HPI    Patient is here today for follow-up on dyslipidemia.  He was taking atorvastatin daily.  Patient states he did stop this as it caused stomach upset.  He has been continuing to stay physically active, still working full-time and reports has lost some weight over the summer.  Patient is presently down 12 pounds from previous visit.  He reports his heartburn is better.  He is hoping his cholesterol has improved as well.    He also updates he has been following with Ortho for right shoulder pain.  Patient reports he has had 2 steroid joint injections which have not really significantly helped the pain.    Hemoglobin A1C (%)   Date Value   04/12/2024 5.5   06/20/2023 5.6             ( goal A1Cis < 7)   No components found for: \"LABMICR\"  No components found for: \"LDLCHOLESTEROL\", \"LDLCALC\"    (goal LDL is <100)   AST (U/L)   Date Value   04/12/2024 16     ALT (U/L)   Date Value   04/12/2024 18     BUN (mg/dL)   Date Value   04/12/2024 17     BP Readings from Last 3 Encounters:   11/12/24 138/84   03/19/24 128/84   06/15/23 108/78          (goal 120/80)    Past Medical History:   Diagnosis Date    Dyslipidemia     GERD (gastroesophageal reflux disease)     Nephrolithiasis       History reviewed. No pertinent surgical history.    Family History   Problem Relation Age of Onset    Cancer Mother         breast    No Known Problems Father     No Known Problems Sister     Other Brother         back surgery    No Known Problems Maternal Grandmother     No Known Problems Maternal Grandfather     No Known Problems Paternal

## 2024-11-12 NOTE — PROGRESS NOTES
Visit Information    Have you changed or started any medications since your last visit including any over-the-counter medicines, vitamins, or herbal medicines? no   Are you having any side effects from any of your medications? -  no  Have you stopped taking any of your medications? Is so, why? -  no    Have you seen any other physician or provider since your last visit? No  Have you had any other diagnostic tests since your last visit? No  Have you been seen in the emergency room and/or had an admission to a hospital since we last saw you? No  Have you had your routine dental cleaning in the past 6 months? no    Have you activated your [facility] account? If not, what are your barriers? Yes     Patient Care Team:  Silva White PA-C as PCP - General (Family Medicine)  Silva White PA-C as PCP - Empaneled Provider    Medical History Review  Past Medical, Family, and Social History reviewed and  contribute to the patient presenting condition    Health Maintenance   Topic Date Due    HIV screen  Never done    Hepatitis B vaccine (1 of 3 - 19+ 3-dose series) Never done    DTaP/Tdap/Td vaccine (1 - Tdap) Never done    Shingles vaccine (1 of 2) Never done    Colorectal Cancer Screen  02/26/2023    Flu vaccine (1) 08/01/2024    COVID-19 Vaccine (2 - 2023-24 season) 09/01/2024    Depression Screen  03/19/2025    Lipids  04/12/2025    Diabetes screen  04/12/2027    Hepatitis C screen  Completed    Hepatitis A vaccine  Aged Out    Hib vaccine  Aged Out    Polio vaccine  Aged Out    Meningococcal (ACWY) vaccine  Aged Out    Pneumococcal 0-64 years Vaccine  Aged Out    Prostate Specific Antigen (PSA) Screening or Monitoring  Discontinued

## 2025-05-13 ENCOUNTER — OFFICE VISIT (OUTPATIENT)
Dept: FAMILY MEDICINE CLINIC | Age: 60
End: 2025-05-13
Payer: COMMERCIAL

## 2025-05-13 VITALS
DIASTOLIC BLOOD PRESSURE: 80 MMHG | OXYGEN SATURATION: 98 % | HEART RATE: 65 BPM | BODY MASS INDEX: 31.75 KG/M2 | SYSTOLIC BLOOD PRESSURE: 124 MMHG | WEIGHT: 208.8 LBS

## 2025-05-13 DIAGNOSIS — Z13.29 SCREENING FOR THYROID DISORDER: ICD-10-CM

## 2025-05-13 DIAGNOSIS — Z12.5 SCREENING FOR PROSTATE CANCER: ICD-10-CM

## 2025-05-13 DIAGNOSIS — Z13.220 SCREENING, LIPID: ICD-10-CM

## 2025-05-13 DIAGNOSIS — Z12.11 SCREENING FOR COLON CANCER: ICD-10-CM

## 2025-05-13 DIAGNOSIS — Z13.1 SCREENING FOR DIABETES MELLITUS: ICD-10-CM

## 2025-05-13 DIAGNOSIS — E78.5 DYSLIPIDEMIA: Primary | ICD-10-CM

## 2025-05-13 DIAGNOSIS — Z23 NEED FOR PNEUMOCOCCAL 20-VALENT CONJUGATE VACCINATION: ICD-10-CM

## 2025-05-13 DIAGNOSIS — R30.0 DYSURIA: ICD-10-CM

## 2025-05-13 PROCEDURE — 90677 PCV20 VACCINE IM: CPT | Performed by: PHYSICIAN ASSISTANT

## 2025-05-13 PROCEDURE — 90471 IMMUNIZATION ADMIN: CPT | Performed by: PHYSICIAN ASSISTANT

## 2025-05-13 PROCEDURE — 99214 OFFICE O/P EST MOD 30 MIN: CPT | Performed by: PHYSICIAN ASSISTANT

## 2025-05-13 SDOH — ECONOMIC STABILITY: FOOD INSECURITY: WITHIN THE PAST 12 MONTHS, THE FOOD YOU BOUGHT JUST DIDN'T LAST AND YOU DIDN'T HAVE MONEY TO GET MORE.: NEVER TRUE

## 2025-05-13 SDOH — ECONOMIC STABILITY: FOOD INSECURITY: WITHIN THE PAST 12 MONTHS, YOU WORRIED THAT YOUR FOOD WOULD RUN OUT BEFORE YOU GOT MONEY TO BUY MORE.: NEVER TRUE

## 2025-05-13 ASSESSMENT — ENCOUNTER SYMPTOMS
NAUSEA: 0
DIARRHEA: 0
VOMITING: 0
COLOR CHANGE: 0
CONSTIPATION: 0
COUGH: 0
ABDOMINAL PAIN: 0
SHORTNESS OF BREATH: 0

## 2025-05-13 ASSESSMENT — PATIENT HEALTH QUESTIONNAIRE - PHQ9
SUM OF ALL RESPONSES TO PHQ QUESTIONS 1-9: 0
2. FEELING DOWN, DEPRESSED OR HOPELESS: NOT AT ALL
1. LITTLE INTEREST OR PLEASURE IN DOING THINGS: NOT AT ALL

## 2025-05-13 NOTE — PROGRESS NOTES
[facility]  [facility]  [address] [address]  Dept: [phone]  Dept Fax: [phone]    Davide Caldera is a 60 y.o. male who presents today for his medical conditions/complaintsas noted below.  Davide Caldera is c/o of   Chief Complaint   Patient presents with    6 Month Follow-Up     6 month follow up   Was not able to do his lab work order due to insurance change     Hyperlipidemia       HPI:     HPI    Patient is here today for follow up on hyperlipidemia. He states he has not had labs done recently as ordered. Had insurance issues.   He states he has been feeling well but does believe he passed a kidney stone. He states he has had many in the past. Denies any visible blood. Had a little urinary discomfort, states not as bad as previous stones. Symptoms fully resolved at this point    Lab Results   Component Value Date    CHOL 243 04/12/2024    TRIG 119 04/12/2024    HDL 50 04/12/2024     (A) 04/12/2024    VLDL 24 04/12/2024    CHOLHDLRATIO 4.9 04/12/2024       Hemoglobin A1C (%)   Date Value   04/12/2024 5.5   06/20/2023 5.6             ( goal A1Cis < 7)   No components found for: \"LABMICR\"  No components found for: \"LDLCHOLESTEROL\", \"LDLCALC\"    (goal LDL is <100)   AST (U/L)   Date Value   04/12/2024 16     ALT (U/L)   Date Value   04/12/2024 18     BUN (mg/dL)   Date Value   04/12/2024 17     BP Readings from Last 3 Encounters:   05/13/25 124/80   11/12/24 138/84   03/19/24 128/84          (goal 120/80)    Past Medical History:   Diagnosis Date    Dyslipidemia     GERD (gastroesophageal reflux disease)     Nephrolithiasis       History reviewed. No pertinent surgical history.    Family History   Problem Relation Age of Onset    Cancer Mother         breast    No Known Problems Father     No Known Problems Sister     Other Brother         back surgery    No Known Problems Maternal Grandmother     No Known Problems Maternal Grandfather     No Known

## 2025-05-16 LAB
ALBUMIN: 4.6 G/DL
ALP BLD-CCNC: 83 U/L
ALT SERPL-CCNC: 20 U/L
ANION GAP SERPL CALCULATED.3IONS-SCNC: 9 MMOL/L
AST SERPL-CCNC: 17 U/L
BASOPHILS ABSOLUTE: 0.1 /ΜL
BASOPHILS RELATIVE PERCENT: 2 %
BILIRUB SERPL-MCNC: 0.7 MG/DL (ref 0.1–1.4)
BILIRUBIN, URINE: NORMAL
BLOOD, URINE: NORMAL
BUN BLDV-MCNC: 16 MG/DL
CALCIUM SERPL-MCNC: 9.3 MG/DL
CHLORIDE BLD-SCNC: 109 MMOL/L
CHOLESTEROL, TOTAL: 249 MG/DL
CHOLESTEROL/HDL RATIO: 5.7
CLARITY, UA: NORMAL
CO2: 26 MMOL/L
COLOR, UA: NORMAL
CREAT SERPL-MCNC: 0.93 MG/DL
EOSINOPHILS ABSOLUTE: 0.1 /ΜL
EOSINOPHILS RELATIVE PERCENT: 2.4 %
ESTIMATED AVERAGE GLUCOSE: 120
GFR, ESTIMATED: >90
GLUCOSE BLD-MCNC: 100 MG/DL
GLUCOSE URINE: NORMAL
HBA1C MFR BLD: 5.8 %
HCT VFR BLD CALC: 42.1 % (ref 41–53)
HDLC SERPL-MCNC: 44 MG/DL (ref 35–70)
HEMOGLOBIN: 14.8 G/DL (ref 13.5–17.5)
KETONES, URINE: NORMAL
LDL CHOLESTEROL: 173
LEUKOCYTE ESTERASE, URINE: NORMAL
LYMPHOCYTES ABSOLUTE: 1.7 /ΜL
LYMPHOCYTES RELATIVE PERCENT: 31.9 %
MCH RBC QN AUTO: 32 PG
MCHC RBC AUTO-ENTMCNC: 35.2 G/DL
MCV RBC AUTO: 91 FL
MONOCYTES ABSOLUTE: 0.5 /ΜL
MONOCYTES RELATIVE PERCENT: 9.3 %
NEUTROPHILS ABSOLUTE: 2.9 /ΜL
NEUTROPHILS RELATIVE PERCENT: 54.4 %
NITRITE, URINE: NORMAL
NONHDLC SERPL-MCNC: NORMAL MG/DL
PDW BLD-RTO: 13.4 %
PH UA: NORMAL
PLATELET # BLD: 222 K/ΜL
PMV BLD AUTO: 8.4 FL
POTASSIUM SERPL-SCNC: 4.1 MMOL/L
PROSTATE SPECIFIC ANTIGEN: 2.4 NG/ML
PROTEIN UA: NORMAL
RBC # BLD: 4.63 10^6/ΜL
SODIUM BLD-SCNC: 144 MMOL/L
SPECIFIC GRAVITY UA: NORMAL
TOTAL PROTEIN: 6.6 G/DL (ref 6.4–8.2)
TRIGL SERPL-MCNC: 162 MG/DL
TSH SERPL DL<=0.05 MIU/L-ACNC: 3.02 UIU/ML
UROBILINOGEN, URINE: NORMAL
VLDLC SERPL CALC-MCNC: 32 MG/DL
WBC # BLD: 5.3 10^3/ML

## 2025-05-19 ENCOUNTER — RESULTS FOLLOW-UP (OUTPATIENT)
Dept: FAMILY MEDICINE CLINIC | Age: 60
End: 2025-05-19

## 2025-05-19 DIAGNOSIS — Z12.5 SCREENING FOR PROSTATE CANCER: ICD-10-CM

## 2025-05-19 DIAGNOSIS — Z13.220 SCREENING, LIPID: ICD-10-CM

## 2025-05-19 DIAGNOSIS — Z13.29 SCREENING FOR THYROID DISORDER: ICD-10-CM

## 2025-05-19 DIAGNOSIS — R30.0 DYSURIA: ICD-10-CM

## 2025-05-19 DIAGNOSIS — E78.5 DYSLIPIDEMIA: ICD-10-CM

## 2025-05-19 DIAGNOSIS — Z13.1 SCREENING FOR DIABETES MELLITUS: ICD-10-CM
